# Patient Record
Sex: FEMALE | Race: BLACK OR AFRICAN AMERICAN | Employment: UNEMPLOYED | URBAN - METROPOLITAN AREA
[De-identification: names, ages, dates, MRNs, and addresses within clinical notes are randomized per-mention and may not be internally consistent; named-entity substitution may affect disease eponyms.]

---

## 2024-08-27 ENCOUNTER — HOSPITAL ENCOUNTER (EMERGENCY)
Age: 39
Discharge: HOME | End: 2024-08-27
Attending: EMERGENCY MEDICINE
Payer: COMMERCIAL

## 2024-08-27 VITALS
HEART RATE: 80 BPM | OXYGEN SATURATION: 100 % | TEMPERATURE: 98.3 F | SYSTOLIC BLOOD PRESSURE: 161 MMHG | DIASTOLIC BLOOD PRESSURE: 91 MMHG | RESPIRATION RATE: 16 BRPM

## 2024-08-27 DIAGNOSIS — M54.2 NECK PAIN: Primary | ICD-10-CM

## 2024-08-27 PROCEDURE — 6370000000 HC RX 637 (ALT 250 FOR IP): Performed by: EMERGENCY MEDICINE

## 2024-08-27 PROCEDURE — 96372 THER/PROPH/DIAG INJ SC/IM: CPT

## 2024-08-27 PROCEDURE — 6360000002 HC RX W HCPCS: Performed by: EMERGENCY MEDICINE

## 2024-08-27 PROCEDURE — 99284 EMERGENCY DEPT VISIT MOD MDM: CPT

## 2024-08-27 RX ORDER — LIDOCAINE 50 MG/G
1 PATCH TOPICAL DAILY
Qty: 10 PATCH | Refills: 0 | Status: SHIPPED | OUTPATIENT
Start: 2024-08-27 | End: 2024-09-06

## 2024-08-27 RX ORDER — IBUPROFEN 600 MG/1
600 TABLET, FILM COATED ORAL 4 TIMES DAILY PRN
Qty: 20 TABLET | Refills: 0 | Status: SHIPPED | OUTPATIENT
Start: 2024-08-27 | End: 2024-09-01

## 2024-08-27 RX ORDER — LIDOCAINE 4 G/G
1 PATCH TOPICAL ONCE
Status: DISCONTINUED | OUTPATIENT
Start: 2024-08-27 | End: 2024-08-27 | Stop reason: HOSPADM

## 2024-08-27 RX ORDER — KETOROLAC TROMETHAMINE 30 MG/ML
30 INJECTION, SOLUTION INTRAMUSCULAR; INTRAVENOUS ONCE
Status: COMPLETED | OUTPATIENT
Start: 2024-08-27 | End: 2024-08-27

## 2024-08-27 RX ORDER — CYCLOBENZAPRINE HCL 10 MG
10 TABLET ORAL 3 TIMES DAILY PRN
Qty: 15 TABLET | Refills: 0 | Status: SHIPPED | OUTPATIENT
Start: 2024-08-27 | End: 2024-09-01

## 2024-08-27 RX ADMIN — KETOROLAC TROMETHAMINE 30 MG: 30 INJECTION, SOLUTION INTRAMUSCULAR; INTRAVENOUS at 06:33

## 2024-08-27 ASSESSMENT — PAIN - FUNCTIONAL ASSESSMENT: PAIN_FUNCTIONAL_ASSESSMENT: 0-10

## 2024-08-27 ASSESSMENT — PAIN DESCRIPTION - LOCATION: LOCATION: NECK;SHOULDER

## 2024-08-27 ASSESSMENT — PAIN SCALES - GENERAL: PAINLEVEL_OUTOF10: 9

## 2024-08-27 ASSESSMENT — PAIN DESCRIPTION - ORIENTATION: ORIENTATION: RIGHT

## 2024-08-27 NOTE — ED NOTES
Pt arrived to ED through triage with c/o of right sided neck and shoulder pain  Pt stated the pain started a week ago but has increased in intensity   Pt stated she thought it was due to increased stress  Pt denies any medical hx or medications daily   Pt VS charted below  Pt appears to be in no acute distress at this time

## 2024-08-27 NOTE — DISCHARGE INSTRUCTIONS
Patient in the ER today for neck pain.  We did trigger point injections.  Will discharge you with a prescription for a muscle relaxant called Flexeril as well as a refill on your Motrin and a lidocaine patch.  Please use this for pain.  As we discussed, please continue to take Tylenol and Motrin as needed.  Please return to the ER for any new or worsening symptoms.  Otherwise, please follow with your primary care provider to be reassessed.  Additionally, please remember that this muscle relaxer can make you Sleepy and you should not take this while driving striving for working.    For pain use acetaminophen (Tylenol) or ibuprofen (Motrin / Advil), unless prescribed medications that have acetaminophen or ibuprofen (or similar medications) in it.  You can take over the counter acetaminophen tablets (1 - 2 tablets of the 500-mg strength every 6 hours) or ibuprofen tablets (2 tablets every 4 hours).    PLEASE RETURN TO THE EMERGENCY DEPARTMENT IMMEDIATELY for worsening symptoms, inability to move your legs, tingling or loss of sensation, or if you develop any concerning symptoms such as: high fever not relieved by acetaminophen (Tylenol) and/or ibuprofen (Motrin / Advil), chills, shortness of breath, chest pain, feeling of your heart fluttering or racing, persistent nausea and/or vomiting, vomiting up blood, blood in your stool, loss of consciousness, numbness, weakness or tingling in the arms or legs or change in color of the extremities, changes in mental status, persistent headache, blurry vision, loss of bladder / bowel control, unable to follow up with your physician, or other any other care or concern.

## 2024-08-27 NOTE — ED PROVIDER NOTES
[facility] ED  Emergency Department Encounter  Emergency Medicine Resident     Pt Name:Martha Oglesby  MRN: [id number]  Birthdate 1985  Date of evaluation: 8/27/24  PCP:  No primary care provider on file.  Note Started: 6:19 AM EDT      CHIEF COMPLAINT       Chief Complaint   Patient presents with    Neck Pain    Shoulder Pain       HISTORY OF PRESENT ILLNESS  (Location/Symptom, Timing/Onset, Context/Setting, Quality, Duration, Modifying Factors, Severity.)      Martha Oglesby is a 38 y.o. female who presents with right side neck pain that is radiating to her shoulder.  Patient states she has had no known injury.  Does not recall specifically sleeping funny.  States she woke up 1 morning and was having pain in her neck.  States it was initially waxing and waning, however has been persistent with prompted ER visit today.  She has tried Tylenol and Motrin at home without significant relief of her symptoms.  She has had no fevers, chills, headaches.  Does have some pain with moving of her neck, however it does not feel stiff or unable to move.  She denies any significant past medical history.    PAST MEDICAL / SURGICAL / SOCIAL / FAMILY HISTORY      has a past medical history of Anemia, Asthma, Gestational diabetes mellitus, antepartum, and Morbid obesity due to excess calories (HCC).       has a past surgical history that includes Strabismus surgery.      Social History     Socioeconomic History    Marital status: Legally      Spouse name: Not on file    Number of children: Not on file    Years of education: Not on file    Highest education level: Not on file   Occupational History    Not on file   Tobacco Use    Smoking status: Never    Smokeless tobacco: Never   Substance and Sexual Activity    Alcohol use: Yes     Alcohol/week: 1.0 standard drink of alcohol     Types: 1 Glasses of wine per week     Comment: once a month     Drug use: No    Sexual activity: Yes

## 2024-08-27 NOTE — ED PROVIDER NOTES
[facility]     Emergency Department     Faculty Note/ Attestation      Pt Name: Martha Oglesby                                       MRN: [id number]  Birthdate 1985  Date of evaluation: 8/27/2024  Note Started: 7:05 AM EDT    Patients PCP:    No primary care provider on file.    Attestation  I performed a history and physical examination of the patient and discussed management with the resident. I reviewed the resident’s note and agree with the documented findings and plan of care. Any areas of disagreement are noted on the chart. I was personally present for the key portions of any procedures. I have documented in the chart those procedures where I was not present during the key portions. I have reviewed the emergency nurses triage note. I agree with the chief complaint, past medical history, past surgical history, allergies, medications, social and family history as documented unless otherwise noted below.    For Physician Assistant/ Nurse Practitioner cases/documentation I have personally evaluated this patient and have completed at least one if not all key elements of the E/M (history, physical exam, and MDM). Additional findings are as noted.    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

## 2024-08-29 ENCOUNTER — HOSPITAL ENCOUNTER (EMERGENCY)
Age: 39
Discharge: HOME | End: 2024-08-30
Attending: EMERGENCY MEDICINE
Payer: COMMERCIAL

## 2024-08-29 VITALS
OXYGEN SATURATION: 100 % | HEART RATE: 80 BPM | SYSTOLIC BLOOD PRESSURE: 144 MMHG | RESPIRATION RATE: 20 BRPM | DIASTOLIC BLOOD PRESSURE: 90 MMHG | TEMPERATURE: 97.4 F

## 2024-08-29 DIAGNOSIS — M54.2 NECK PAIN: Primary | ICD-10-CM

## 2024-08-29 DIAGNOSIS — M50.30 DEGENERATIVE DISC DISEASE, CERVICAL: ICD-10-CM

## 2024-08-29 PROCEDURE — 99284 EMERGENCY DEPT VISIT MOD MDM: CPT

## 2024-08-29 PROCEDURE — 6370000000 HC RX 637 (ALT 250 FOR IP)

## 2024-08-29 RX ORDER — PREDNISONE 20 MG/1
60 TABLET ORAL ONCE
Status: COMPLETED | OUTPATIENT
Start: 2024-08-30 | End: 2024-08-29

## 2024-08-29 RX ORDER — NAPROXEN 250 MG/1
500 TABLET ORAL ONCE
Status: COMPLETED | OUTPATIENT
Start: 2024-08-30 | End: 2024-08-29

## 2024-08-29 RX ADMIN — PREDNISONE 60 MG: 20 TABLET ORAL at 23:54

## 2024-08-29 RX ADMIN — NAPROXEN 500 MG: 250 TABLET ORAL at 23:54

## 2024-08-29 ASSESSMENT — PAIN - FUNCTIONAL ASSESSMENT: PAIN_FUNCTIONAL_ASSESSMENT: 0-10

## 2024-08-29 ASSESSMENT — PAIN SCALES - GENERAL
PAINLEVEL_OUTOF10: 8
PAINLEVEL_OUTOF10: 8

## 2024-08-30 ENCOUNTER — APPOINTMENT (OUTPATIENT)
Dept: CT IMAGING | Age: 39
End: 2024-08-30
Payer: COMMERCIAL

## 2024-08-30 PROCEDURE — 70490 CT SOFT TISSUE NECK W/O DYE: CPT

## 2024-08-30 RX ORDER — PREDNISONE 20 MG/1
20 TABLET ORAL DAILY
Qty: 5 TABLET | Refills: 0 | Status: SHIPPED | OUTPATIENT
Start: 2024-08-30 | End: 2024-09-04

## 2024-08-30 ASSESSMENT — ENCOUNTER SYMPTOMS
COUGH: 0
CHEST TIGHTNESS: 0
SHORTNESS OF BREATH: 0
SORE THROAT: 0

## 2024-08-30 NOTE — ED PROVIDER NOTES
[facility]     Emergency Department     Faculty Attestation    I performed a history and physical examination of the patient and discussed management with the resident. I have reviewed and agree with the resident’s findings including all diagnostic interpretations, and treatment plans as written. Any areas of disagreement are noted on the chart. I was personally present for the key portions of any procedures. I have documented in the chart those procedures where I was not present during the key portions. I have reviewed the emergency nurses triage note. I agree with the chief complaint, past medical history, past surgical history, allergies, medications, social and family history as documented unless otherwise noted below. Documentation of the HPI, Physical Exam and Medical Decision Making performed by scribmorteza is based on my personal performance of the HPI, PE and MDM. For Physician Assistant/ Nurse Practitioner cases/documentation I have personally evaluated this patient and have completed at least one if not all key elements of the E/M (history, physical exam, and MDM). Additional findings are as noted.    Note Started: 11:45 PM EDT     39 yo F neck pain, seen yesterday for same, lidocaine, flexeril helping somewhat but pain is continuing, denies direct injury   PE vss gcs 15 midline cervical tenderness present, bilateral muscle strain present, paraspinal musculature in the cervical spine, sensation intact bilateral upper extremities, hand grasp equal    CT C-spine stable    EKG Interpretation    Interpreted by me      CRITICAL CARE: There was a high probability of clinically significant/life threatening deterioration in this patient's condition which required my urgent intervention.  Total critical care time was 5 minutes.  This excludes any time for separately reportable procedures.       Leonid Glass DO  08/29/24 0701

## 2024-08-30 NOTE — ED NOTES
Pt arrived via triage with c/o neck pain.   Pt states she was recently seen here for the same complaint.  Pt reports pain becoming increasingly worse.   Pt states she is taking the tylenol and muscle relaxants that she was prescribed regularly.  Pt alert and oriented x4.  NAD at this time.   Vitals documented.   Call light in reach

## 2024-08-30 NOTE — ED PROVIDER NOTES
[facility] ED  Emergency Department Encounter  Emergency Medicine Resident     Pt Name:Martha Oglesby  MRN: [id number]  Birthdate 1985  Date of evaluation: 8/30/24  PCP:  No primary care provider on file.  Note Started: 12:43 AM EDT      CHIEF COMPLAINT       Chief Complaint   Patient presents with    Neck Pain       HISTORY OF PRESENT ILLNESS  (Location/Symptom, Timing/Onset, Context/Setting, Quality, Duration, Modifying Factors, Severity.)      Martha Oglesby is a 38 y.o. female who presents with c/o neck pain.   Pt states she was recently seen here for the same complaint. Pt reports pain stared on the right side then progressed to the left side. Pt states the the pain becoming increasingly worse. Pt states she is taking the tylenol, Motrin and muscle relaxants that she was prescribed regularly but it did not help.  Patient states that there is a pressure around her neck.  Patient states that she is not able to turn the left or right.  Patient denies any previous or recent trauma.  Patient states that she is safe at home.    PAST MEDICAL / SURGICAL / SOCIAL / FAMILY HISTORY      has a past medical history of Anemia, Asthma, Gestational diabetes mellitus, antepartum, and Morbid obesity due to excess calories (HCC).       has a past surgical history that includes Strabismus surgery.      Social History     Socioeconomic History    Marital status: Legally      Spouse name: Not on file    Number of children: Not on file    Years of education: Not on file    Highest education level: Not on file   Occupational History    Not on file   Tobacco Use    Smoking status: Never    Smokeless tobacco: Never   Substance and Sexual Activity    Alcohol use: Yes     Alcohol/week: 1.0 standard drink of alcohol     Types: 1 Glasses of wine per week     Comment: once a month     Drug use: No    Sexual activity: Yes     Partners: Male   Other Topics Concern    Not on file   Social History  a visit   For hospital follow up      DISCHARGE MEDICATIONS:  New Prescriptions    PREDNISONE (DELTASONE) 20 MG TABLET    Take 1 tablet by mouth daily for 5 days       Teddy Carrizales MD  Emergency Medicine Resident    (Please note that portions of thisnote were completed with a voice recognition program.  Efforts were made to edit the dictations but occasionally words are mis-transcribed.)

## 2024-08-30 NOTE — DISCHARGE INSTRUCTIONS
RETURN INCREASE PAIN, SENSORY CHANGE OR FEVER,     FOLLOW UP WITH FAMILY PHYSICIAN,     MOTRIN AS NEEDED, ICE, AVOID INJURY,       You came to the ED with a neck pain, you were given steroids and naproxen. CT neck results was showing Degenerative disc disease. No fractures.

## 2025-08-02 VITALS
HEART RATE: 95 BPM | TEMPERATURE: 99 F | RESPIRATION RATE: 16 BRPM | DIASTOLIC BLOOD PRESSURE: 90 MMHG | OXYGEN SATURATION: 99 % | SYSTOLIC BLOOD PRESSURE: 149 MMHG

## 2025-08-02 PROCEDURE — 99285 EMERGENCY DEPT VISIT HI MDM: CPT

## 2025-08-03 ENCOUNTER — APPOINTMENT (OUTPATIENT)
Dept: GENERAL RADIOLOGY | Age: 40
End: 2025-08-03
Payer: COMMERCIAL

## 2025-08-03 ENCOUNTER — HOSPITAL ENCOUNTER (EMERGENCY)
Age: 40
Discharge: HOME | End: 2025-08-03
Attending: EMERGENCY MEDICINE
Payer: COMMERCIAL

## 2025-08-03 DIAGNOSIS — S29.012A UPPER BACK STRAIN, INITIAL ENCOUNTER: Primary | ICD-10-CM

## 2025-08-03 LAB — HCG SERPL QL: NEGATIVE

## 2025-08-03 PROCEDURE — 93005 ELECTROCARDIOGRAM TRACING: CPT

## 2025-08-03 PROCEDURE — 6370000000 HC RX 637 (ALT 250 FOR IP)

## 2025-08-03 PROCEDURE — 84703 CHORIONIC GONADOTROPIN ASSAY: CPT

## 2025-08-03 PROCEDURE — 71101 X-RAY EXAM UNILAT RIBS/CHEST: CPT

## 2025-08-03 PROCEDURE — 96374 THER/PROPH/DIAG INJ IV PUSH: CPT

## 2025-08-03 PROCEDURE — 6360000002 HC RX W HCPCS

## 2025-08-03 RX ORDER — LIDOCAINE 4 G/G
1 PATCH TOPICAL ONCE
Status: DISCONTINUED | OUTPATIENT
Start: 2025-08-03 | End: 2025-08-03 | Stop reason: HOSPADM

## 2025-08-03 RX ORDER — GABAPENTIN 100 MG/1
100 CAPSULE ORAL ONCE
Status: COMPLETED | OUTPATIENT
Start: 2025-08-03 | End: 2025-08-03

## 2025-08-03 RX ORDER — KETOROLAC TROMETHAMINE 15 MG/ML
15 INJECTION, SOLUTION INTRAMUSCULAR; INTRAVENOUS ONCE
Status: COMPLETED | OUTPATIENT
Start: 2025-08-03 | End: 2025-08-03

## 2025-08-03 RX ADMIN — KETOROLAC TROMETHAMINE 15 MG: 15 INJECTION, SOLUTION INTRAMUSCULAR; INTRAVENOUS at 03:50

## 2025-08-03 RX ADMIN — GABAPENTIN 100 MG: 100 CAPSULE ORAL at 03:49

## 2025-08-03 ASSESSMENT — PAIN - FUNCTIONAL ASSESSMENT: PAIN_FUNCTIONAL_ASSESSMENT: NONE - DENIES PAIN

## 2025-08-04 LAB
EKG ATRIAL RATE: 83 BPM
EKG P AXIS: 45 DEGREES
EKG P-R INTERVAL: 136 MS
EKG Q-T INTERVAL: 376 MS
EKG QRS DURATION: 84 MS
EKG QTC CALCULATION (BAZETT): 441 MS
EKG R AXIS: 44 DEGREES
EKG T AXIS: 16 DEGREES
EKG VENTRICULAR RATE: 83 BPM

## 2025-08-04 PROCEDURE — 93010 ELECTROCARDIOGRAM REPORT: CPT | Performed by: INTERNAL MEDICINE
